# Patient Record
Sex: MALE | Race: WHITE | NOT HISPANIC OR LATINO
[De-identification: names, ages, dates, MRNs, and addresses within clinical notes are randomized per-mention and may not be internally consistent; named-entity substitution may affect disease eponyms.]

---

## 2020-04-14 ENCOUNTER — LABORATORY RESULT (OUTPATIENT)
Age: 40
End: 2020-04-14

## 2022-07-27 ENCOUNTER — FORM ENCOUNTER (OUTPATIENT)
Age: 42
End: 2022-07-27

## 2022-07-28 ENCOUNTER — APPOINTMENT (OUTPATIENT)
Dept: ORTHOPEDIC SURGERY | Facility: CLINIC | Age: 42
End: 2022-07-28

## 2022-07-28 ENCOUNTER — APPOINTMENT (OUTPATIENT)
Dept: MRI IMAGING | Facility: CLINIC | Age: 42
End: 2022-07-28

## 2022-07-28 VITALS — BODY MASS INDEX: 34.36 KG/M2 | HEIGHT: 70 IN | WEIGHT: 240 LBS

## 2022-07-28 DIAGNOSIS — S83.411A SPRAIN OF MEDIAL COLLATERAL LIGAMENT OF RIGHT KNEE, INITIAL ENCOUNTER: ICD-10-CM

## 2022-07-28 PROCEDURE — 73562 X-RAY EXAM OF KNEE 3: CPT | Mod: RT

## 2022-07-28 PROCEDURE — 73721 MRI JNT OF LWR EXTRE W/O DYE: CPT | Mod: RT

## 2022-07-28 PROCEDURE — 99203 OFFICE O/P NEW LOW 30 MIN: CPT

## 2022-07-28 NOTE — IMAGING
[de-identified] : The patient ambulates with mild right antalgic gait\par \par Right knee\par Slight swelling\par Mild effusion.  Mild to moderate tenderness over the femoral insertion of the MCL.  Mild medial joint line tenderness\par Passive range of motion 0° to 125°\par Pain with valgus stress medial knee.  Ligaments appear stable.  Patient is guarding\par Quad strength 4+/5\par \par Right leg\par No swelling\par Calf is soft and nontender\par Posterior tibial pulse 2+\par  [Right] : right knee [FreeTextEntry9] : Reviewed and interpreted.  Right knee AP standing, lateral, sunrise views-minimal degenerative changes

## 2022-07-28 NOTE — DISCUSSION/SUMMARY
[de-identified] : The patient will have MRI right knee\par He was given hinge brace with patellar cut out to wear when standing and walking.  Remove when sitting and sleeping\par Ice p.r.n.\par Continue ibuprofen p.r.n.\par He was instructed in isometric strengthening\par \par Impression:\par July 5, 2022\par MCL sprain right knee, rule out meniscal tear.  Chondromalacia patella/minimal osteoarthritis

## 2022-07-28 NOTE — HISTORY OF PRESENT ILLNESS
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  He did not feel a pop or snap.  Since that times had mild to moderate pain right medial knee standing and walking.  Pain with stairs and movie sign.  No buckling or locking.  Taking ibuprofen p.r.n. [] : no [FreeTextEntry1] : right knee [de-identified] : -Con Santino

## 2022-08-01 ENCOUNTER — NON-APPOINTMENT (OUTPATIENT)
Age: 42
End: 2022-08-01

## 2022-09-01 ENCOUNTER — APPOINTMENT (OUTPATIENT)
Dept: ORTHOPEDIC SURGERY | Facility: CLINIC | Age: 42
End: 2022-09-01

## 2022-09-01 VITALS — BODY MASS INDEX: 34.36 KG/M2 | HEIGHT: 70 IN | WEIGHT: 240 LBS

## 2022-09-01 PROCEDURE — 99214 OFFICE O/P EST MOD 30 MIN: CPT

## 2022-09-01 NOTE — DISCUSSION/SUMMARY
[de-identified] : MRI results right knee with discussed with the patient\par Continue hinge brace with patellar cut out  when standing and walking p.r.n..  Remove when sitting and sleeping\par Ice p.r.n.\par Continue ibuprofen p.r.n.\par Continue PT at [facility] and [facility] in [place]\par I discussed possible Euflexxa injections right knee if ongoing symptoms.  Risks benefits and the alternatives were discussed.  I gave him a pamphlet I also discussed possible remote risk of joint infection which would require arthroscopic debridement and possibly resulting in accelerated Arthritic changes\par I discussed possible arthroscopy and debridement if ongoing symptoms\par \par Impression:\par July 5, 2022\par Mild osteoarthritis  right knee/chondromalacia patella/tear medial meniscus

## 2022-09-01 NOTE — DATA REVIEWED
[MRI] : MRI [Right] : of the right [Knee] : knee [I independently reviewed and interpreted images and report] : I independently reviewed and interpreted images and report [FreeTextEntry1] : MRI right knee done July 28, 2022 was reviewed. There is complex tear posterior horn medial meniscus. Degenerative changes medial compartment. Mild MCL sprain. Posteromedial popliteal cyst was surrounding soft tissue edema, possible partial rupture \par

## 2022-09-01 NOTE — HISTORY OF PRESENT ILLNESS
[Gradual] : gradual [3] : 3 [Dull/Aching] : dull/aching [Intermittent] : intermittent [Rest] : rest [Stairs] : stairs [Full time] : Work status: full time [1] : 1 [Euflexxa] : Euflexxa [de-identified] : The patient has been going to physical therapy at [facility] and [facility]  in [place].  He is feeling better.  He has mild occasional pain standing and walking.  Mild pain with stairs.  Doing home exercises.  He had MRI right knee. [] : Post Surgical Visit: no

## 2022-09-08 ENCOUNTER — APPOINTMENT (OUTPATIENT)
Dept: ORTHOPEDIC SURGERY | Facility: CLINIC | Age: 42
End: 2022-09-08

## 2022-09-15 ENCOUNTER — APPOINTMENT (OUTPATIENT)
Dept: ORTHOPEDIC SURGERY | Facility: CLINIC | Age: 42
End: 2022-09-15

## 2022-10-06 ENCOUNTER — APPOINTMENT (OUTPATIENT)
Dept: ORTHOPEDIC SURGERY | Facility: CLINIC | Age: 42
End: 2022-10-06

## 2022-10-06 VITALS — BODY MASS INDEX: 34.36 KG/M2 | HEIGHT: 70 IN | WEIGHT: 240 LBS

## 2022-10-06 DIAGNOSIS — S83.411D SPRAIN OF MEDIAL COLLATERAL LIGAMENT OF RIGHT KNEE, SUBSEQUENT ENCOUNTER: ICD-10-CM

## 2022-10-06 PROCEDURE — 99213 OFFICE O/P EST LOW 20 MIN: CPT

## 2022-10-06 NOTE — HISTORY OF PRESENT ILLNESS
[Sudden] : sudden [3] : 3 [Dull/Aching] : dull/aching [Intermittent] : intermittent [Leisure] : leisure [Rest] : rest [Injection therapy] : injection therapy [Stairs] : stairs [Full time] : Work status: full time [1] : 1 [Euflexxa] : Euflexxa [de-identified] : The patient continues to improve with physical therapy.  He has mild occasional pain right knee with activities.  No buckling locking or swelling.  Occasional pain when doing squats.   [] : Post Surgical Visit: no [de-identified] : 9/1/2022 [de-identified] : Dr. Jimenes

## 2022-10-06 NOTE — DISCUSSION/SUMMARY
[de-identified] : Ice p.r.n.\par Continue ibuprofen p.r.n.\par Continue PT at [facility] and [facility] in [place]\par Again, I discussed starting Euflexxa injections.  He says he would like to hold off on this since he is minimally symptomatic\par \par \par Impression:\par July 5, 2022\par Mild osteoarthritis  right knee/chondromalacia patella/tear medial meniscus

## 2022-10-06 NOTE — IMAGING
[de-identified] : Normal gait\par \par Right knee\par No swelling\par Slight medial facet tenderness\par Passive range of motion 0° to 125°\par Ligaments are stable\par Quad strength 5/5\par \par Right leg\par No swelling\par Calf is soft and nontender\par Posterior tibial pulse 2+\par

## 2022-10-20 ENCOUNTER — APPOINTMENT (OUTPATIENT)
Dept: ORTHOPEDIC SURGERY | Facility: CLINIC | Age: 42
End: 2022-10-20

## 2022-10-27 ENCOUNTER — APPOINTMENT (OUTPATIENT)
Dept: ORTHOPEDIC SURGERY | Facility: CLINIC | Age: 42
End: 2022-10-27

## 2022-12-15 ENCOUNTER — APPOINTMENT (OUTPATIENT)
Dept: ORTHOPEDIC SURGERY | Facility: CLINIC | Age: 42
End: 2022-12-15

## 2022-12-15 VITALS — BODY MASS INDEX: 34.36 KG/M2 | WEIGHT: 240 LBS | HEIGHT: 70 IN

## 2022-12-15 DIAGNOSIS — S83.231D COMPLEX TEAR OF MEDIAL MENISCUS, CURRENT INJURY, RIGHT KNEE, SUBSEQUENT ENCOUNTER: ICD-10-CM

## 2022-12-15 DIAGNOSIS — M17.11 UNILATERAL PRIMARY OSTEOARTHRITIS, RIGHT KNEE: ICD-10-CM

## 2022-12-15 PROCEDURE — 99214 OFFICE O/P EST MOD 30 MIN: CPT

## 2022-12-15 NOTE — HISTORY OF PRESENT ILLNESS
[Gradual] : gradual [3] : 3 [Dull/Aching] : dull/aching [Leisure] : leisure [Physical therapy] : physical therapy [Exercising] : exercising [Full time] : Work status: full time [de-identified] : The patient has continued pain right knee.  He has mild occasional pain standing and walking.  Mild to moderate pain when twisting.  Minimal improvement with physical therapy.  Doing home exercise program [] : Post Surgical Visit: no [de-identified] : Dr. Jimenes  [de-identified] : 10/6/2022 [de-identified] : 12/12/2022 [de-identified] :  Con Santino

## 2022-12-15 NOTE — DISCUSSION/SUMMARY
[de-identified] : Ice p.r.n.\par Continue ibuprofen p.r.n.\par He will finish formal PT\par Because of his continued symptoms I discussed arthroscopy, possible meniscectomy and debridement.  Risks benefits and the alternatives were discussed\par Recommend he have surgical consult with Dr. Fernandes or Dr. Merino\par \par \par Impression:\par July 5, 2022\par Mild osteoarthritis  right knee/chondromalacia patella/tear medial meniscus

## 2023-08-22 ENCOUNTER — APPOINTMENT (OUTPATIENT)
Dept: ORTHOPEDIC SURGERY | Facility: CLINIC | Age: 43
End: 2023-08-22
Payer: COMMERCIAL

## 2023-08-22 VITALS — HEIGHT: 70 IN | BODY MASS INDEX: 34.36 KG/M2 | WEIGHT: 240 LBS

## 2023-08-22 DIAGNOSIS — S16.1XXA STRAIN OF MUSCLE, FASCIA AND TENDON AT NECK LEVEL, INITIAL ENCOUNTER: ICD-10-CM

## 2023-08-22 PROCEDURE — 99214 OFFICE O/P EST MOD 30 MIN: CPT

## 2023-08-22 PROCEDURE — 72040 X-RAY EXAM NECK SPINE 2-3 VW: CPT

## 2023-08-22 PROCEDURE — 73030 X-RAY EXAM OF SHOULDER: CPT | Mod: RT

## 2023-08-22 RX ORDER — BENZONATATE 100 MG/1
100 CAPSULE ORAL
Qty: 30 | Refills: 0 | Status: COMPLETED | COMMUNITY
Start: 2022-04-27 | End: 2023-08-22

## 2023-08-22 RX ORDER — FLUTICASONE PROPIONATE 50 UG/1
50 SPRAY, METERED NASAL
Qty: 16 | Refills: 0 | Status: COMPLETED | COMMUNITY
Start: 2022-04-27 | End: 2023-08-22

## 2023-08-22 NOTE — HISTORY OF PRESENT ILLNESS
[Right Arm] : right arm [Gradual] : gradual [10] : 10 [6] : 6 [Radiating] : radiating [Sharp] : sharp [Constant] : constant [Household chores] : household chores [Leisure] : leisure [Sleep] : sleep [Rest] : rest [Full time] : Work status: full time [de-identified] : Patient is a 42-year-old right-hand-dominant male with neck and right shoulder pain over the past couple of months.  No injury.  His right shoulder pain significantly worse on Sunday, August 20.  Since that time he has had moderate to severe pain in his right shoulder.  Difficulty lifting his arm.  Mild swelling.  No fever or chills. He has mild neck pain and stiffness.   No radicular complaints.  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

## 2023-08-22 NOTE — DISCUSSION/SUMMARY
[Medication Risks Reviewed] : Medication risks reviewed [de-identified] : Various options were discussed with the patient I discussed cortisone injection right shoulder.  Risks benefits and the alternatives were discussed I discussed trying Medrol Dosepak.  Risks benefits and alternatives were discussed.  He would like to try the Dosepak Ice p.r.n. Tylenol p.r.n. He will have MRI right shoulder He will stay out of work He will work on gentle active-assisted range of motion exercises right shoulder.  He was instructed in this  Impression: Cervical strain/spondylosis Acute bursitis right shoulder/calcific tendinitis.  Rule out rotator cuff tear, call pathology

## 2023-08-22 NOTE — REASON FOR VISIT
[FreeTextEntry2] : Pain neck and right shoulder over the past couple months..  Increased pain right shoulder over the past 3 days

## 2023-08-22 NOTE — IMAGING
[Right] : right shoulder [de-identified] : Cervical spine Inspection normal Mild tenderness trapezius on the right Good active range of motion Negative foraminal closing test bilaterally  Right shoulder Mild swelling anteriorly.  No erythema Moderate tenderness anterior subacromial region with palpable fullness Active forward flexion 60 degrees, passive forward flexion 120 degrees with moderate pain Radial pulse 2+  [FreeTextEntry1] : Reviewed and interpreted.  Right shoulder XR rotation and outlet views-type I-II acromion.  Mild calcifications at supraspinatus insertion

## 2023-08-23 ENCOUNTER — RESULT REVIEW (OUTPATIENT)
Age: 43
End: 2023-08-23

## 2023-08-24 ENCOUNTER — NON-APPOINTMENT (OUTPATIENT)
Age: 43
End: 2023-08-24

## 2023-08-28 ENCOUNTER — APPOINTMENT (OUTPATIENT)
Dept: ORTHOPEDIC SURGERY | Facility: CLINIC | Age: 43
End: 2023-08-28
Payer: COMMERCIAL

## 2023-08-28 VITALS — HEIGHT: 70 IN | BODY MASS INDEX: 34.36 KG/M2 | WEIGHT: 240 LBS

## 2023-08-28 PROCEDURE — 99214 OFFICE O/P EST MOD 30 MIN: CPT

## 2023-08-28 NOTE — DISCUSSION/SUMMARY
[Medication Risks Reviewed] : Medication risks reviewed [de-identified] : MRI right shoulder was discussed with the patient I offered the patient a cortisone injection right shoulder today.  Risks benefits and the alternatives were discussed.  He will think about this He can try diclofenac 75 mg twice daily with food prn. Tylenol p.r.n. Ice prn He will have MRI cervical spine He will stay out of work He will work on gentle active-assisted range of motion exercises right shoulder.  He was instructed in this He will start physical therapy  Impression: Cervical strain/spondylosis Acute bursitis right shoulder/calcific tendinitis

## 2023-08-28 NOTE — IMAGING
[de-identified] : Cervical spine Inspection normal Mild tenderness trapezius on the right Good active range of motion Foraminal closing test with rotation to the right produces pain radiating to right posterior scapular region  Right shoulder No swelling Mild tenderness anterior subacromial region Active forward flexion 140 degrees, external rotation 60 degrees, internal rotation lower lumbar region Passive forward flexion 180 degrees Positive impingement 160 degrees Supraspinatus strength 4/5 Radial pulse 2+

## 2023-08-28 NOTE — HISTORY OF PRESENT ILLNESS
[Right Arm] : right arm [Gradual] : gradual [7] : 7 [Radiating] : radiating [Sharp] : sharp [Intermittent] : intermittent [Household chores] : household chores [Leisure] : leisure [Sleep] : sleep [Rest] : rest [Exercising] : exercising [Full time] : Work status: full time [de-identified] : The patient feels a little better since last visit.  He has mild neck pain In regard to his neck he has occasional radiation pain down his right arm with numbness and tingling He has mild to moderate pain in his right shoulder with movement.  Occasional awakening from sleep at night.  Weakness in the shoulder.  Difficulty lifting his arm.  He feels a little better after completing Medrol Dosepak.  He had MRI right shoulder  [] : Post Surgical Visit: no [FreeTextEntry6] : R Arm  [de-identified] : 8/22/2023 [de-identified] : Dr. 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

## 2023-08-28 NOTE — DATA REVIEWED
[MRI] : MRI [Right] : of the right [Shoulder] : shoulder [I independently reviewed and interpreted images and report] : I independently reviewed and interpreted images and report [FreeTextEntry1] : MRI right shoulder done August 23, 2023 was reviewed and discussed with Dr. Javier. There is calcific tendinosis in the supraspinatus tendon with subacromial bursitis. The rotator cuff is intact. Mild edema adjacent deltoid muscle

## 2023-08-31 ENCOUNTER — APPOINTMENT (OUTPATIENT)
Dept: MRI IMAGING | Facility: CLINIC | Age: 43
End: 2023-08-31

## 2023-08-31 ENCOUNTER — APPOINTMENT (OUTPATIENT)
Dept: MRI IMAGING | Facility: CLINIC | Age: 43
End: 2023-08-31
Payer: COMMERCIAL

## 2023-08-31 PROCEDURE — 72141 MRI NECK SPINE W/O DYE: CPT

## 2023-09-05 ENCOUNTER — APPOINTMENT (OUTPATIENT)
Dept: ORTHOPEDIC SURGERY | Facility: CLINIC | Age: 43
End: 2023-09-05

## 2023-09-05 ENCOUNTER — APPOINTMENT (OUTPATIENT)
Dept: ORTHOPEDIC SURGERY | Facility: CLINIC | Age: 43
End: 2023-09-05
Payer: COMMERCIAL

## 2023-09-05 VITALS — WEIGHT: 240 LBS | HEIGHT: 70 IN | BODY MASS INDEX: 34.36 KG/M2

## 2023-09-05 DIAGNOSIS — R93.7 ABNORMAL FINDINGS ON DIAGNOSTIC IMAGING OF OTHER PARTS OF MUSCULOSKELETAL SYSTEM: ICD-10-CM

## 2023-09-05 PROCEDURE — 99214 OFFICE O/P EST MOD 30 MIN: CPT

## 2023-09-05 RX ORDER — HALOBETASOL PROPIONATE 0.5 MG/G
0.05 CREAM TOPICAL
Qty: 50 | Refills: 0 | Status: ACTIVE | COMMUNITY
Start: 2023-05-03

## 2023-09-05 RX ORDER — LIDOCAINE HYDROCHLORIDE 20 MG/ML
2 SOLUTION ORAL; TOPICAL
Qty: 150 | Refills: 0 | Status: ACTIVE | COMMUNITY
Start: 2023-04-08

## 2023-09-05 RX ORDER — CLINDAMYCIN HYDROCHLORIDE 300 MG/1
300 CAPSULE ORAL
Qty: 30 | Refills: 0 | Status: DISCONTINUED | COMMUNITY
Start: 2023-04-23 | End: 2023-09-05

## 2023-09-05 RX ORDER — DICLOFENAC SODIUM 75 MG/1
75 TABLET, DELAYED RELEASE ORAL
Qty: 60 | Refills: 1 | Status: DISCONTINUED | COMMUNITY
Start: 2023-08-28 | End: 2023-09-05

## 2023-09-05 RX ORDER — AMOXICILLIN 875 MG/1
875 TABLET, FILM COATED ORAL
Qty: 12 | Refills: 0 | Status: DISCONTINUED | COMMUNITY
Start: 2023-04-29 | End: 2023-09-05

## 2023-09-05 RX ORDER — METHYLPREDNISOLONE 4 MG/1
4 TABLET ORAL
Qty: 1 | Refills: 0 | Status: DISCONTINUED | COMMUNITY
Start: 2023-08-22 | End: 2023-09-05

## 2023-09-05 NOTE — DATA REVIEWED
[MRI] : MRI [Cervical Spine] : cervical spine [I independently reviewed and interpreted images and report] : I independently reviewed and interpreted images and report [FreeTextEntry1] : MRI cervical spine done August 31, 2023 was reviewed. There is mild multilevel degenerative disc disease. Mild broad disc/osteophytes at C5-6 and C6-7. Motion artifact

## 2023-09-05 NOTE — HISTORY OF PRESENT ILLNESS
[Neck] : neck [Right Arm] : right arm [Gradual] : gradual [6] : 6 [Tightness] : tightness [Intermittent] : intermittent [Leisure] : leisure [Rest] : rest [Physical therapy] : physical therapy [Exercising] : exercising [Full time] : Work status: full time [de-identified] : The patient has mild occasional neck pain and stiffness.  No radicular complaints.  He had MRI cervical spine He has continued mild to moderate pain right shoulder reaching above him and behind him.  Pain awakens him from sleep at night.  He stopped diclofenac because this gave him diarrhea.  Taking Tylenol prn.  He started physical therapy with Lucio Jones [] : Post Surgical Visit: no [FreeTextEntry7] : R Arm [de-identified] : 8/28/2023 [de-identified] : Dr. 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

## 2023-09-05 NOTE — DISCUSSION/SUMMARY
[Medication Risks Reviewed] : Medication risks reviewed [de-identified] : MRI cervical spine was discussed with the patient I offered the patient a cortisone injection right shoulder today.  Risks benefits and the alternatives were discussed.  He will think about this Tylenol p.r.n. Ice prn He will try to return to light duty on September 12, 2023 He will work on gentle active-assisted range of motion exercises right shoulder.  He was instructed in this Continue physical therapy with Lucio Jones  Impression: Cervical strain/spondylosis Acute bursitis right shoulder/calcific tendinitis/impingement

## 2023-09-05 NOTE — IMAGING
[de-identified] : Cervical spine Inspection normal Mild tenderness trapezius on the right Good active range of motion Negative foraminal closing test  Right shoulder No swelling Mild tenderness anterior subacromial region Active forward flexion 160 degrees, external rotation 60 degrees, internal rotation lower lumbar region Passive forward flexion 180 degrees Positive impingement 160 degrees Supraspinatus strength 4+/5 Radial pulse 2+

## 2023-10-04 ENCOUNTER — APPOINTMENT (OUTPATIENT)
Dept: ORTHOPEDIC SURGERY | Facility: CLINIC | Age: 43
End: 2023-10-04
Payer: COMMERCIAL

## 2023-10-04 VITALS — BODY MASS INDEX: 34.36 KG/M2 | WEIGHT: 240 LBS | HEIGHT: 70 IN

## 2023-10-04 DIAGNOSIS — M75.51 BURSITIS OF RIGHT SHOULDER: ICD-10-CM

## 2023-10-04 PROCEDURE — 99213 OFFICE O/P EST LOW 20 MIN: CPT | Mod: 25

## 2023-10-04 PROCEDURE — 20610 DRAIN/INJ JOINT/BURSA W/O US: CPT | Mod: RT

## 2023-10-04 RX ORDER — METHYLPREDNISOLONE ACETATE 40 MG/ML
40 INJECTION, SUSPENSION INTRA-ARTICULAR; INTRALESIONAL; INTRAMUSCULAR; SOFT TISSUE
Refills: 0 | Status: COMPLETED | OUTPATIENT
Start: 2023-10-04

## 2023-10-04 RX ADMIN — METHYLPREDNISOLONE ACETATE MG/ML: 40 INJECTION, SUSPENSION INTRA-ARTICULAR; INTRALESIONAL; INTRAMUSCULAR; SOFT TISSUE at 00:00

## 2023-11-15 ENCOUNTER — APPOINTMENT (OUTPATIENT)
Dept: ORTHOPEDIC SURGERY | Facility: CLINIC | Age: 43
End: 2023-11-15
Payer: COMMERCIAL

## 2023-11-15 VITALS — BODY MASS INDEX: 34.36 KG/M2 | HEIGHT: 70 IN | WEIGHT: 240 LBS

## 2023-11-15 DIAGNOSIS — M25.811 OTHER SPECIFIED JOINT DISORDERS, RIGHT SHOULDER: ICD-10-CM

## 2023-11-15 DIAGNOSIS — M79.601 PAIN IN RIGHT ARM: ICD-10-CM

## 2023-11-15 DIAGNOSIS — S16.1XXD STRAIN OF MUSCLE, FASCIA AND TENDON AT NECK LEVEL, SUBSEQUENT ENCOUNTER: ICD-10-CM

## 2023-11-15 DIAGNOSIS — M47.812 SPONDYLOSIS W/OUT MYELOPATHY OR RADICULOPATHY, CERVICAL REGION: ICD-10-CM

## 2023-11-15 PROCEDURE — 73060 X-RAY EXAM OF HUMERUS: CPT | Mod: RT

## 2023-11-15 PROCEDURE — 99213 OFFICE O/P EST LOW 20 MIN: CPT

## 2023-11-15 PROCEDURE — 73030 X-RAY EXAM OF SHOULDER: CPT | Mod: RT

## 2023-11-16 PROBLEM — S16.1XXD CERVICAL STRAIN, SUBSEQUENT ENCOUNTER: Status: ACTIVE | Noted: 2023-08-28

## 2023-11-16 PROBLEM — M47.812 CERVICAL SPONDYLOSIS WITHOUT MYELOPATHY: Status: ACTIVE | Noted: 2023-08-22

## 2023-11-20 ENCOUNTER — APPOINTMENT (OUTPATIENT)
Dept: ORTHOPEDIC SURGERY | Facility: CLINIC | Age: 43
End: 2023-11-20
Payer: COMMERCIAL

## 2023-11-20 VITALS — WEIGHT: 240 LBS | HEIGHT: 70 IN | BODY MASS INDEX: 34.36 KG/M2

## 2023-11-20 PROCEDURE — 73010 X-RAY EXAM OF SHOULDER BLADE: CPT | Mod: RT

## 2023-11-20 PROCEDURE — 99214 OFFICE O/P EST MOD 30 MIN: CPT

## 2023-11-20 RX ORDER — METHYLPREDNISOLONE 4 MG/1
4 TABLET ORAL
Qty: 1 | Refills: 0 | Status: ACTIVE | COMMUNITY
Start: 2023-11-20 | End: 1900-01-01

## 2023-11-20 RX ORDER — CELECOXIB 200 MG/1
200 CAPSULE ORAL TWICE DAILY
Qty: 60 | Refills: 0 | Status: COMPLETED | COMMUNITY
Start: 2023-11-20 | End: 2023-12-20

## 2023-12-18 ENCOUNTER — APPOINTMENT (OUTPATIENT)
Dept: ORTHOPEDIC SURGERY | Facility: CLINIC | Age: 43
End: 2023-12-18

## 2024-01-05 ENCOUNTER — RESULT CHARGE (OUTPATIENT)
Age: 44
End: 2024-01-05

## 2024-01-05 ENCOUNTER — APPOINTMENT (OUTPATIENT)
Dept: ORTHOPEDIC SURGERY | Facility: CLINIC | Age: 44
End: 2024-01-05
Payer: COMMERCIAL

## 2024-01-05 VITALS — WEIGHT: 240 LBS | HEIGHT: 70 IN | BODY MASS INDEX: 34.36 KG/M2

## 2024-01-05 PROCEDURE — 73030 X-RAY EXAM OF SHOULDER: CPT | Mod: RT

## 2024-01-05 PROCEDURE — 99214 OFFICE O/P EST MOD 30 MIN: CPT

## 2024-01-05 RX ORDER — CELECOXIB 200 MG/1
200 CAPSULE ORAL TWICE DAILY
Qty: 60 | Refills: 0 | Status: COMPLETED | COMMUNITY
Start: 2024-01-05 | End: 2024-02-04

## 2024-01-05 NOTE — ASSESSMENT
[FreeTextEntry1] : We discussed the underlying pathology. Calcific tendonitis improved upon review of radiographic imaging.   Treatment options reviewed.  Prescribed Celebrex.  Plan for PT.  Cautions discussed.  Questions answered.  If pain or discomfort persists we will discuss repeat injection.   Patient seen by Jermain Schmidt Shoulder Surgery  The documentation recorded by the scribe accurately reflects the service I personally performed and the decisions made by me. Entered by Michelle Lindsey acting as scribe.

## 2024-01-05 NOTE — PHYSICAL EXAM
[Right] : right shoulder [Mild] : mild [Left] : left shoulder [Sitting] : sitting [] : no sensory deficits [FreeTextEntry3] : No calor.  [FreeTextEntry9] : IR to T10. [TWNoteComboBox4] : passive forward flexion 165 degrees [de-identified] : external rotation 60 degrees

## 2024-01-05 NOTE — HISTORY OF PRESENT ILLNESS
[de-identified] : Here for right shoulder follow up. Finished MDP which gave temporary relief. Pain is overall the same since the last visit.

## 2024-01-05 NOTE — DATA REVIEWED
[FreeTextEntry1] : RIGHT SHOULDER MRI ([facility]) 8/23/23: There are 2 calcium deposits. One in the anterior supraspinatus and one in the infraspinatus. There are no major cuff tears.

## 2024-01-05 NOTE — REASON FOR VISIT
[FreeTextEntry2] : This is a 43 year old RHD male  with right shoulder pain since August 2023 without injury. Has seen Dr. Jimenes and tried MDP and CSI on 10/4/23 with some relief. He went to many sessions of physical therapy and feels he plateaued. Reaching is painful. He has pain and trouble throwing. Night symptoms can occur. There is no n/t. NSAID use as needed with temporary relief. An MRI was done 8/23/23.  The MDP didn't help long term.

## 2024-01-22 ENCOUNTER — APPOINTMENT (OUTPATIENT)
Dept: ORTHOPEDIC SURGERY | Facility: CLINIC | Age: 44
End: 2024-01-22
Payer: COMMERCIAL

## 2024-01-22 VITALS — HEIGHT: 70 IN | WEIGHT: 240 LBS | BODY MASS INDEX: 34.36 KG/M2

## 2024-01-22 DIAGNOSIS — M75.31 CALCIFIC TENDINITIS OF RIGHT SHOULDER: ICD-10-CM

## 2024-01-22 PROCEDURE — 73030 X-RAY EXAM OF SHOULDER: CPT | Mod: RT

## 2024-01-22 PROCEDURE — 99214 OFFICE O/P EST MOD 30 MIN: CPT

## 2024-01-22 RX ORDER — CELECOXIB 200 MG/1
200 CAPSULE ORAL TWICE DAILY
Qty: 60 | Refills: 0 | Status: COMPLETED | COMMUNITY
Start: 2024-01-22 | End: 2024-02-21

## 2024-01-22 NOTE — PHYSICAL EXAM
[Right] : right shoulder [Mild] : mild [Left] : left shoulder [Sitting] : sitting [FreeTextEntry3] : No calor.  [] : no sensory deficits [de-identified] : Minimal impingement.  [FreeTextEntry9] : IR to T10. [TWNoteComboBox4] : passive forward flexion 165 degrees [de-identified] : external rotation 60 degrees

## 2024-01-22 NOTE — ASSESSMENT
[FreeTextEntry1] : We discussed his course.  PT is prescribed for if he needs it.  Celebrex is renewed.  He will call for follow up.  Questions answered.   Patient seen by Jermain Schmidt M.D. Entered by Lupe Mireles acting as scribe.

## 2024-01-22 NOTE — REASON FOR VISIT
[FreeTextEntry2] : This is a 43 year old RHD male  with right shoulder pain since August 2023 without injury. Has seen Dr. Jimenes and tried MDP and CSI on 10/4/23 with some relief. He went to many sessions of physical therapy and feels he plateaued. Reaching is painful. He has pain and trouble throwing. Night symptoms can occur. There is no n/t. NSAID use as needed with temporary relief. An MRI was done 8/23/23.  The MDP didn't help long term. He has been taking steroids and Celebrex which helps. He feels about 66% better.

## 2024-04-19 ENCOUNTER — NON-APPOINTMENT (OUTPATIENT)
Age: 44
End: 2024-04-19

## 2025-01-14 ENCOUNTER — APPOINTMENT (OUTPATIENT)
Dept: ORTHOPEDIC SURGERY | Facility: CLINIC | Age: 45
End: 2025-01-14
Payer: COMMERCIAL

## 2025-01-14 VITALS — WEIGHT: 240 LBS | BODY MASS INDEX: 34.36 KG/M2 | HEIGHT: 70 IN

## 2025-01-14 DIAGNOSIS — M17.0 BILATERAL PRIMARY OSTEOARTHRITIS OF KNEE: ICD-10-CM

## 2025-01-14 DIAGNOSIS — M22.41 CHONDROMALACIA PATELLAE, RIGHT KNEE: ICD-10-CM

## 2025-01-14 DIAGNOSIS — M22.42 CHONDROMALACIA PATELLAE, LEFT KNEE: ICD-10-CM

## 2025-01-14 PROCEDURE — 73562 X-RAY EXAM OF KNEE 3: CPT | Mod: RT

## 2025-01-14 PROCEDURE — 99214 OFFICE O/P EST MOD 30 MIN: CPT

## 2025-01-15 ENCOUNTER — NON-APPOINTMENT (OUTPATIENT)
Age: 45
End: 2025-01-15

## 2025-02-12 ENCOUNTER — APPOINTMENT (OUTPATIENT)
Dept: ORTHOPEDIC SURGERY | Facility: CLINIC | Age: 45
End: 2025-02-12
Payer: COMMERCIAL

## 2025-02-12 DIAGNOSIS — M17.0 BILATERAL PRIMARY OSTEOARTHRITIS OF KNEE: ICD-10-CM

## 2025-02-12 PROCEDURE — 20611 DRAIN/INJ JOINT/BURSA W/US: CPT | Mod: 50

## 2025-02-12 RX ORDER — HYALURONATE SODIUM 20 MG/2 ML
20 SYRINGE (ML) INTRAARTICULAR
Refills: 0 | Status: COMPLETED | OUTPATIENT
Start: 2025-02-12

## 2025-02-12 RX ADMIN — Medication MG/2ML: at 00:00

## 2025-02-26 ENCOUNTER — APPOINTMENT (OUTPATIENT)
Dept: ORTHOPEDIC SURGERY | Facility: CLINIC | Age: 45
End: 2025-02-26
Payer: COMMERCIAL

## 2025-02-26 PROCEDURE — 20611 DRAIN/INJ JOINT/BURSA W/US: CPT | Mod: 50

## 2025-02-26 RX ORDER — HYALURONATE SODIUM 20 MG/2 ML
20 SYRINGE (ML) INTRAARTICULAR
Refills: 0 | Status: COMPLETED | OUTPATIENT
Start: 2025-02-26

## 2025-02-26 RX ADMIN — Medication MG/2ML: at 00:00

## 2025-03-05 ENCOUNTER — APPOINTMENT (OUTPATIENT)
Dept: ORTHOPEDIC SURGERY | Facility: CLINIC | Age: 45
End: 2025-03-05
Payer: COMMERCIAL

## 2025-03-05 DIAGNOSIS — M22.41 CHONDROMALACIA PATELLAE, RIGHT KNEE: ICD-10-CM

## 2025-03-05 DIAGNOSIS — M17.0 BILATERAL PRIMARY OSTEOARTHRITIS OF KNEE: ICD-10-CM

## 2025-03-05 DIAGNOSIS — M22.42 CHONDROMALACIA PATELLAE, LEFT KNEE: ICD-10-CM

## 2025-03-05 PROCEDURE — 20611 DRAIN/INJ JOINT/BURSA W/US: CPT | Mod: 50

## 2025-03-05 RX ORDER — AZITHROMYCIN 1 G/1
1 POWDER, FOR SUSPENSION ORAL
Refills: 0 | Status: ACTIVE | COMMUNITY

## 2025-03-05 RX ORDER — HYALURONATE SODIUM 20 MG/2 ML
20 SYRINGE (ML) INTRAARTICULAR
Refills: 0 | Status: COMPLETED | OUTPATIENT
Start: 2025-03-05

## 2025-03-05 RX ORDER — PREDNISONE 20 MG/1
20 TABLET ORAL
Refills: 0 | Status: ACTIVE | COMMUNITY

## 2025-03-05 RX ORDER — ALBUTEROL SULFATE 2.5 MG/3ML
(2.5 MG/3ML) VIAL, NEBULIZER (ML) INHALATION
Refills: 0 | Status: ACTIVE | COMMUNITY

## 2025-03-05 RX ADMIN — Medication MG/2ML: at 00:00

## 2025-03-31 ENCOUNTER — APPOINTMENT (OUTPATIENT)
Dept: ORTHOPEDIC SURGERY | Facility: CLINIC | Age: 45
End: 2025-03-31
Payer: COMMERCIAL

## 2025-03-31 VITALS — WEIGHT: 245 LBS | BODY MASS INDEX: 35.07 KG/M2 | HEIGHT: 70 IN

## 2025-03-31 DIAGNOSIS — M75.32 CALCIFIC TENDINITIS OF LEFT SHOULDER: ICD-10-CM

## 2025-03-31 DIAGNOSIS — Z78.9 OTHER SPECIFIED HEALTH STATUS: ICD-10-CM

## 2025-03-31 DIAGNOSIS — M25.519 PAIN IN UNSPECIFIED SHOULDER: ICD-10-CM

## 2025-03-31 PROCEDURE — 73030 X-RAY EXAM OF SHOULDER: CPT | Mod: LT

## 2025-03-31 PROCEDURE — 73010 X-RAY EXAM OF SHOULDER BLADE: CPT | Mod: LT

## 2025-03-31 PROCEDURE — 99213 OFFICE O/P EST LOW 20 MIN: CPT

## 2025-03-31 RX ORDER — METHYLPREDNISOLONE 4 MG/1
4 TABLET ORAL
Qty: 1 | Refills: 0 | Status: ACTIVE | COMMUNITY
Start: 2025-03-31 | End: 1900-01-01

## 2025-05-12 ENCOUNTER — APPOINTMENT (OUTPATIENT)
Dept: ORTHOPEDIC SURGERY | Facility: CLINIC | Age: 45
End: 2025-05-12